# Patient Record
Sex: FEMALE | Race: WHITE | NOT HISPANIC OR LATINO
[De-identification: names, ages, dates, MRNs, and addresses within clinical notes are randomized per-mention and may not be internally consistent; named-entity substitution may affect disease eponyms.]

---

## 2017-03-09 ENCOUNTER — APPOINTMENT (OUTPATIENT)
Age: 62
End: 2017-03-09

## 2017-03-09 VITALS
WEIGHT: 132 LBS | DIASTOLIC BLOOD PRESSURE: 72 MMHG | HEIGHT: 65.5 IN | RESPIRATION RATE: 14 BRPM | BODY MASS INDEX: 21.73 KG/M2 | SYSTOLIC BLOOD PRESSURE: 113 MMHG | HEART RATE: 75 BPM | TEMPERATURE: 98 F

## 2017-03-10 LAB
ALBUMIN SERPL ELPH-MCNC: 4.4 G/DL
ALP BLD-CCNC: 49 U/L
ALT SERPL-CCNC: 11 U/L
ANION GAP SERPL CALC-SCNC: 12 MMOL/L
AST SERPL-CCNC: 24 U/L
BASOPHILS # BLD AUTO: 0.01 K/UL
BASOPHILS NFR BLD AUTO: 0.3 %
BILIRUB SERPL-MCNC: 0.5 MG/DL
BUN SERPL-MCNC: 11 MG/DL
CALCIUM SERPL-MCNC: 9.9 MG/DL
CANCER AG19-9 SERPL-ACNC: 20.5 U/ML
CHLORIDE SERPL-SCNC: 100 MMOL/L
CO2 SERPL-SCNC: 24 MMOL/L
CREAT SERPL-MCNC: 0.7 MG/DL
EOSINOPHIL # BLD AUTO: 0.04 K/UL
EOSINOPHIL NFR BLD AUTO: 1.1 %
GLUCOSE SERPL-MCNC: 92 MG/DL
HBV SURFACE AG SER QL: NONREACTIVE
HCT VFR BLD CALC: 37.4 %
HGB BLD-MCNC: 12 G/DL
IMM GRANULOCYTES NFR BLD AUTO: 0.3 %
INR PPP: 1.06 RATIO
LYMPHOCYTES # BLD AUTO: 1.39 K/UL
LYMPHOCYTES NFR BLD AUTO: 38.9 %
MAN DIFF?: NORMAL
MCHC RBC-ENTMCNC: 28.1 PG
MCHC RBC-ENTMCNC: 32.1 GM/DL
MCV RBC AUTO: 87.6 FL
MONOCYTES # BLD AUTO: 0.26 K/UL
MONOCYTES NFR BLD AUTO: 7.3 %
NEUTROPHILS # BLD AUTO: 1.86 K/UL
NEUTROPHILS NFR BLD AUTO: 52.1 %
PLATELET # BLD AUTO: 144 K/UL
POTASSIUM SERPL-SCNC: 4.7 MMOL/L
PROT SERPL-MCNC: 7.7 G/DL
PT BLD: 12 SEC
RBC # BLD: 4.27 M/UL
RBC # FLD: 14.2 %
SODIUM SERPL-SCNC: 135 MMOL/L
WBC # FLD AUTO: 3.57 K/UL

## 2017-03-13 LAB
AFP-TM SERPL-MCNC: 3.1 NG/ML
HCV RNA SERPL NAA DL=5-ACNC: NOT DETECTED IU/ML
HCV RNA SERPL NAA+PROBE-LOG IU: NOT DETECTED LOGIU/ML

## 2017-03-21 ENCOUNTER — APPOINTMENT (OUTPATIENT)
Dept: SURGERY | Facility: CLINIC | Age: 62
End: 2017-03-21
Payer: COMMERCIAL

## 2017-03-21 VITALS
DIASTOLIC BLOOD PRESSURE: 77 MMHG | BODY MASS INDEX: 21.69 KG/M2 | WEIGHT: 135 LBS | SYSTOLIC BLOOD PRESSURE: 137 MMHG | HEIGHT: 66 IN | TEMPERATURE: 98.6 F | HEART RATE: 71 BPM

## 2017-03-21 PROCEDURE — 99242 OFF/OP CONSLTJ NEW/EST SF 20: CPT

## 2017-03-22 ENCOUNTER — TRANSCRIPTION ENCOUNTER (OUTPATIENT)
Age: 62
End: 2017-03-22

## 2018-02-22 ENCOUNTER — APPOINTMENT (OUTPATIENT)
Dept: MRI IMAGING | Facility: CLINIC | Age: 63
End: 2018-02-22
Payer: COMMERCIAL

## 2018-02-22 ENCOUNTER — OUTPATIENT (OUTPATIENT)
Dept: OUTPATIENT SERVICES | Facility: HOSPITAL | Age: 63
LOS: 1 days | End: 2018-02-22
Payer: COMMERCIAL

## 2018-02-22 DIAGNOSIS — Z00.8 ENCOUNTER FOR OTHER GENERAL EXAMINATION: ICD-10-CM

## 2018-02-22 PROCEDURE — 82565 ASSAY OF CREATININE: CPT

## 2018-02-22 PROCEDURE — A9585: CPT

## 2018-02-22 PROCEDURE — 74183 MRI ABD W/O CNTR FLWD CNTR: CPT

## 2018-02-22 PROCEDURE — 74183 MRI ABD W/O CNTR FLWD CNTR: CPT | Mod: 26

## 2020-03-30 ENCOUNTER — TRANSCRIPTION ENCOUNTER (OUTPATIENT)
Age: 65
End: 2020-03-30

## 2021-08-31 ENCOUNTER — TRANSCRIPTION ENCOUNTER (OUTPATIENT)
Age: 66
End: 2021-08-31

## 2021-12-08 ENCOUNTER — APPOINTMENT (OUTPATIENT)
Dept: HEPATOLOGY | Facility: CLINIC | Age: 66
End: 2021-12-08

## 2021-12-13 ENCOUNTER — TRANSCRIPTION ENCOUNTER (OUTPATIENT)
Age: 66
End: 2021-12-13

## 2021-12-15 ENCOUNTER — APPOINTMENT (OUTPATIENT)
Dept: HEPATOLOGY | Facility: CLINIC | Age: 66
End: 2021-12-15

## 2021-12-15 ENCOUNTER — APPOINTMENT (OUTPATIENT)
Dept: HEPATOLOGY | Facility: CLINIC | Age: 66
End: 2021-12-15
Payer: MEDICARE

## 2021-12-15 VITALS
HEIGHT: 66 IN | RESPIRATION RATE: 15 BRPM | HEART RATE: 72 BPM | WEIGHT: 131 LBS | BODY MASS INDEX: 21.05 KG/M2 | OXYGEN SATURATION: 100 % | SYSTOLIC BLOOD PRESSURE: 131 MMHG | TEMPERATURE: 97.2 F | DIASTOLIC BLOOD PRESSURE: 80 MMHG

## 2021-12-15 DIAGNOSIS — E87.1 HYPO-OSMOLALITY AND HYPONATREMIA: ICD-10-CM

## 2021-12-15 DIAGNOSIS — Z78.9 OTHER SPECIFIED HEALTH STATUS: ICD-10-CM

## 2021-12-15 DIAGNOSIS — R16.0 HEPATOMEGALY, NOT ELSEWHERE CLASSIFIED: ICD-10-CM

## 2021-12-15 PROCEDURE — 99215 OFFICE O/P EST HI 40 MIN: CPT

## 2021-12-15 RX ORDER — FERROUS FUMARATE 324(106)MG
324 TABLET ORAL
Refills: 0 | Status: ACTIVE | COMMUNITY

## 2021-12-15 RX ORDER — DENOSUMAB 60 MG/ML
60 INJECTION SUBCUTANEOUS
Refills: 0 | Status: ACTIVE | COMMUNITY
Start: 2021-12-15

## 2021-12-16 PROBLEM — E87.1 HYPONATREMIA: Status: ACTIVE | Noted: 2021-12-16

## 2021-12-16 PROBLEM — Z78.9 IMMUNE TO HEPATITIS A: Status: ACTIVE | Noted: 2021-12-16

## 2021-12-16 PROBLEM — Z78.9 IMMUNE TO HEPATITIS B: Status: ACTIVE | Noted: 2021-12-16

## 2021-12-16 LAB
AFP-TM SERPL-MCNC: 3.9 NG/ML
ALBUMIN SERPL ELPH-MCNC: 5.3 G/DL
ALP BLD-CCNC: 74 U/L
ALT SERPL-CCNC: 17 U/L
ANION GAP SERPL CALC-SCNC: 12 MMOL/L
AST SERPL-CCNC: 26 U/L
BASOPHILS # BLD AUTO: 0.01 K/UL
BASOPHILS NFR BLD AUTO: 0.2 %
BILIRUB SERPL-MCNC: 0.5 MG/DL
BUN SERPL-MCNC: 9 MG/DL
CALCIUM SERPL-MCNC: 10.4 MG/DL
CANCER AG19-9 SERPL-ACNC: 14 U/ML
CEA SERPL-MCNC: 0.7 NG/ML
CHLORIDE SERPL-SCNC: 97 MMOL/L
CO2 SERPL-SCNC: 23 MMOL/L
COVID-19 SPIKE DOMAIN ANTIBODY INTERPRETATION: POSITIVE
CREAT SERPL-MCNC: 0.7 MG/DL
EOSINOPHIL # BLD AUTO: 0.02 K/UL
EOSINOPHIL NFR BLD AUTO: 0.5 %
HBV SURFACE AB SER QL: REACTIVE
HCT VFR BLD CALC: 38.7 %
HEPATITIS A IGG ANTIBODY: REACTIVE
HGB BLD-MCNC: 12.4 G/DL
IMM GRANULOCYTES NFR BLD AUTO: 0.2 %
INR PPP: 1.04 RATIO
LYMPHOCYTES # BLD AUTO: 1.55 K/UL
LYMPHOCYTES NFR BLD AUTO: 37.5 %
MAN DIFF?: NORMAL
MCHC RBC-ENTMCNC: 29 PG
MCHC RBC-ENTMCNC: 32 GM/DL
MCV RBC AUTO: 90.4 FL
MONOCYTES # BLD AUTO: 0.25 K/UL
MONOCYTES NFR BLD AUTO: 6.1 %
NEUTROPHILS # BLD AUTO: 2.29 K/UL
NEUTROPHILS NFR BLD AUTO: 55.5 %
PLATELET # BLD AUTO: 148 K/UL
POTASSIUM SERPL-SCNC: 4.4 MMOL/L
PROT SERPL-MCNC: 8.2 G/DL
PT BLD: 12.2 SEC
RBC # BLD: 4.28 M/UL
RBC # FLD: 13.7 %
SARS-COV-2 AB SERPL IA-ACNC: >250 U/ML
SODIUM SERPL-SCNC: 132 MMOL/L
WBC # FLD AUTO: 4.13 K/UL

## 2021-12-16 NOTE — PHYSICAL EXAM
[General Appearance - Alert] : alert [General Appearance - Well Nourished] : well nourished [Sclera] : the sclera and conjunctiva were normal [Neck Appearance] : the appearance of the neck was normal [Exaggerated Use Of Accessory Muscles For Inspiration] : no accessory muscle use [Heart Sounds] : normal S1 and S2 [Edema] : there was no peripheral edema [Veins - Varicosity Changes] : there were no varicosital changes [Bowel Sounds] : normal bowel sounds [Abdomen Tenderness] : non-tender [Cervical Lymph Nodes Enlarged Posterior Bilaterally] : posterior cervical [Supraclavicular Lymph Nodes Enlarged Bilaterally] : supraclavicular [No CVA Tenderness] : no ~M costovertebral angle tenderness [Skin Color & Pigmentation] : normal skin color and pigmentation [] : no rash [Skin Lesions] : no skin lesions [Oriented To Time, Place, And Person] : oriented to person, place, and time [Scleral Icterus] : No Scleral Icterus [Spider Angioma] : No spider angioma(s) were observed [Abdominal  Ascites] : no ascites [Asterixis] : no asterixis observed [Jaundice] : No jaundice [Palmar Erythema] : no Palmar Erythema [Depression] : no depression

## 2021-12-16 NOTE — HISTORY OF PRESENT ILLNESS
[de-identified] : US Ab on 12/16/2021 shows No focal lesions, No Ascites and no biliary ductal dilatation. [de-identified] : Ms. HARRISON HUGGINS is 66 year old female accompanied by spouse who presents for the initial evaluation with hepatic hemangiomas, she was seen by Dr. Foster in 03/09/2017. She feels well. Denies c/o abdominal pain, pruritis, melena, No MH/O Liver diseases, clinical hepatitis, Jaundice. Denies any FH/O Liver disease or cancers. \par \par Social H/O denies alcohol use or smoking. She is nurse by profession.\par \par Labs on 12/15/2021 shows Immunity to HAV, HBV and COVID spike, WDL- AFP, Ca19-9, CEA, INR, CBC, CMP except High Alb 5.3; Low Na+ 132 and chronic low Plt 148 > 144 compared to 03/21/2017 labs non-reactive to HBV and HCV.\par \par MRI @ [facility] on 11/19/2021 shows Stable appearance with large dominant hemangioma and several scattered hemangiomas. No new or suspicious lesions are seen. Compared to MRI on 12/11/2020 and 12/15/2016.\par \par US Ab @ [facility] on 10/29/2021 shows very large liver, left lobe and ant right lobe hyperechoic liver mass.\par

## 2021-12-16 NOTE — ASSESSMENT
[FreeTextEntry1] : Ms. HARRISON HUGGINS is 66 year old female who presents to re-establish care with hepatic hemangiomas with past Hep F/U with Dr. Foster in 03/09/2017. \par \par # Immunity - Labs on 12/15/2021 shows Immunity to HAV, HBV and COVID spike.\par \par # Hemangiomas - MRI @ [facility] on 11/19/2021 shows Stable appearance with large dominant hemangioma and several scattered hemangiomas. No new or suspicious lesions are seen. Compared to MRI on 12/11/2020 and 12/15/2016. WDL- AFP, Ca19-9, CEA, INR, CBC, and Liver enzymes. Denies any FH/O Liver disease or cancers. She is nurse by profession.\par \par -TB recommendations: 03/29/2017 The MRI demonstrates many large blood vessels coursing through her normal liver to feed. The hemangioma and the surgical risk of resection is considerable. HB have advised the patient to avoid trauma and a folate, aspirin or anticoagulants. Was advised to have a repeat MRI and HEP visit in one year. If she notes any symptoms related to her abdomen that she will seek follow-up at that time. \par -Surgery consult: Dr. Antony Wray’s Plan, just follow-up, not necessary for surgery at this point (03/21/2017) as MRI which shows bilobar disease involving both lobes of the liver.  \par \par # Hepatomegaly - US Ab @ [facility] on 10/29/2021 shows very large liver 20 cm, left lobe and ant right lobe hyperechoic liver mass. No interventions needed at this time.\par \par # Thrombocytopenia - chronic low Plt 148 > 144 compared to 03/21/2017 without any splenomegaly.  \par \par # Health maintenance- Incidental labs finding - High Alb 5.3; Low Na+ 132. Advised to F/U with PCP as she has insurance changing since 2022 and cannot be followed up with us.\par \par PLAN of care explained to spouse that may need an annual MRI with any new Ab s/s. Tumor markers and labs ordered for next year.\par Encouraged to call back in the interim with any issues or concerns so that we can address and assist as required.

## 2022-01-04 ENCOUNTER — NON-APPOINTMENT (OUTPATIENT)
Age: 67
End: 2022-01-04

## 2024-02-27 DIAGNOSIS — M85.80 OTHER SPECIFIED DISORDERS OF BONE DENSITY AND STRUCTURE, UNSPECIFIED SITE: ICD-10-CM

## 2024-02-27 DIAGNOSIS — D18.03 HEMANGIOMA OF INTRA-ABDOMINAL STRUCTURES: ICD-10-CM

## 2024-02-27 DIAGNOSIS — R16.0 HEPATOMEGALY, NOT ELSEWHERE CLASSIFIED: ICD-10-CM

## 2024-03-05 ENCOUNTER — APPOINTMENT (OUTPATIENT)
Dept: ORTHOPEDIC SURGERY | Facility: CLINIC | Age: 69
End: 2024-03-05

## 2024-06-24 ENCOUNTER — TRANSCRIPTION ENCOUNTER (OUTPATIENT)
Age: 69
End: 2024-06-24

## 2024-07-19 ENCOUNTER — APPOINTMENT (OUTPATIENT)
Dept: HEPATOLOGY | Facility: CLINIC | Age: 69
End: 2024-07-19
Payer: MEDICARE

## 2024-07-19 VITALS
OXYGEN SATURATION: 98 % | HEIGHT: 66 IN | SYSTOLIC BLOOD PRESSURE: 127 MMHG | BODY MASS INDEX: 21.53 KG/M2 | HEART RATE: 77 BPM | WEIGHT: 134 LBS | TEMPERATURE: 97.3 F | DIASTOLIC BLOOD PRESSURE: 76 MMHG

## 2024-07-19 DIAGNOSIS — R18.8 OTHER ASCITES: ICD-10-CM

## 2024-07-19 DIAGNOSIS — Z78.9 OTHER SPECIFIED HEALTH STATUS: ICD-10-CM

## 2024-07-19 DIAGNOSIS — D69.6 THROMBOCYTOPENIA, UNSPECIFIED: ICD-10-CM

## 2024-07-19 DIAGNOSIS — R16.0 HEPATOMEGALY, NOT ELSEWHERE CLASSIFIED: ICD-10-CM

## 2024-07-19 DIAGNOSIS — E87.1 HYPO-OSMOLALITY AND HYPONATREMIA: ICD-10-CM

## 2024-07-19 DIAGNOSIS — D18.03 HEMANGIOMA OF INTRA-ABDOMINAL STRUCTURES: ICD-10-CM

## 2024-07-19 PROCEDURE — 99215 OFFICE O/P EST HI 40 MIN: CPT

## 2024-07-20 PROBLEM — R18.8 ASCITES OF LIVER: Status: ACTIVE | Noted: 2024-07-20
